# Patient Record
Sex: MALE | URBAN - METROPOLITAN AREA
[De-identification: names, ages, dates, MRNs, and addresses within clinical notes are randomized per-mention and may not be internally consistent; named-entity substitution may affect disease eponyms.]

---

## 2021-05-25 ENCOUNTER — OFFICE VISIT (OUTPATIENT)
Dept: URBAN - METROPOLITAN AREA CLINIC 44 | Facility: CLINIC | Age: 56
End: 2021-05-25
Payer: COMMERCIAL

## 2021-05-25 DIAGNOSIS — H52.4 PRESBYOPIA: ICD-10-CM

## 2021-05-25 DIAGNOSIS — H16.143 PUNCTATE KERATITIS, BILATERAL: Primary | ICD-10-CM

## 2021-05-25 PROCEDURE — 92004 COMPRE OPH EXAM NEW PT 1/>: CPT | Performed by: OPTOMETRIST

## 2021-05-25 ASSESSMENT — INTRAOCULAR PRESSURE
OS: 20
OD: 20

## 2021-05-25 ASSESSMENT — KERATOMETRY
OD: 44.50
OS: 44.50

## 2021-05-25 ASSESSMENT — VISUAL ACUITY
OD: 20/25
OS: 20/25

## 2021-05-25 NOTE — IMPRESSION/PLAN
Impression: Presbyopia: H52.4. Plan: Patient declined new glasses prescription. Continue with OTC reading glasses.

## 2021-05-25 NOTE — IMPRESSION/PLAN
Impression: Punctate keratitis, bilateral: H16.143. Plan: Recommend start AT's (Ex. Systane Complete) at least QID or more while symptomatic. Can also try a gel (Ex. Systane Gel) in the evenings before going to sleep. Start daily warm compresses for 5-10 minutes a day using a heating pad (do not burn yourself). Start daily baby shampoo lid scrubs to get rid of any extra debris and bacteria on the lids that can irritate the eyes.